# Patient Record
(demographics unavailable — no encounter records)

---

## 2024-10-22 NOTE — PHYSICAL EXAM
[No Edema] : there was no peripheral edema [Normal] : affect was normal and insight and judgment were intact [de-identified] : cn ii-xii without new deficits, 5/5 muscle strength in all 4 extremities

## 2024-10-22 NOTE — HISTORY OF PRESENT ILLNESS
[de-identified] : 68 y/o female with pmhx of HTN, hld, CKD presents for acute visit. About one month ago, she was on a flight. She was sleeping and then she woke up and felt very warm. She then lost consciousness and was shaking. Her mouth was clenched and she urinated on herself. States it lasted about a few minutes. It resolved on its own and she was placed on oxygen. She did not seek emergency medical attention after as she was evaluated by EMTs and found to be stable. Has never happened before.   Patient is concerned that her ct chest showed coronary artery calcifications and would like to be further evaluated. Denies any chest pain

## 2024-10-22 NOTE — ASSESSMENT
[FreeTextEntry1] : S/p Seizure - first episode will work up for underlying etiology - will check cbc, cmp, tfts will send for MRI brain w/w/o iv contrast refer to neurology patient instructed that if she has a repeat episode she is to call 911 and go to the ER immediately  HTN uncontrolled, has previously been well controlled at home will continue amlodipine 5mg daily, hydrochlorothiazide 12.5mg daily, and nebivolol 10mg daily patient to check bp daily and keep a log, if it is persistently >140/90 patient to call  Prediabetes will check hgba1c  HLD continue rosuvastatin 5mg daily  Coronary artery calcifications on CT continue rosuvastatin and will check lipid panel will refer to cardio

## 2024-10-28 NOTE — HISTORY OF PRESENT ILLNESS
[FreeTextEntry1] : 70 y/o female with pmhx of HTN, hld, CKD presents for recent seizure.  Patient reports being in usual state of health until September 7, 2024.  She was on a flight to [place] and was seen in the middle fell asleep woke up feeling hot daughter then turned on the air and rested her head on a table.  She then jerked back to chair arms rigid and starting convulsing lasted 1 to 2 minutes then came to and heard them talking but could not say anything.  A nurse and doctor on the flight came to assist and place patient in a supine position and was noted to be incontinent of urine.  She has some postictal confusion for few minutes and then was back to her self.  Oxygen was administered. When she landed she was back to her self was evaluated by EMS. For rest of the trip she rested and has not had any recurrent events.  She denies any change in her stresses any sleep deprivation or any alcohol or drug use. Does snore She reports from time to time she can have these hot flash feelings at times she can feel "off" and last for few minutes and then resolves.  She reports as a child she had recurrent   episodes of passing out was not evaluated for it and resolved during a high school years. She did have a fall in early 2020s missed a step and landed on her head, had fracture of her right hand as a result never evaluated with a head CT scan  There is no family history of seizures Denies any daydreaming zoning out episodes

## 2024-10-28 NOTE — ASSESSMENT
[FreeTextEntry1] : 68 y/o female with pmhx of HTN, hld, CKD presents today to establish care for first time  GTC sz with urinary incontinence.  unknown trigger.  She does have a history of recurrent syncope episodes as a child or teenager with never formally evaluated for this unsure if these have any clinical significance.  Brain MRI w/wo ordered by PCPRule any structural changes 24hr EEG r/oEpileptiform changes. HSt r/o KURTIS Not interested in medications at this time I have advised patient and daughter of seizure precautions further eval based on above findings.

## 2024-10-28 NOTE — PHYSICAL EXAM
[General Appearance - In No Acute Distress] : in no acute distress [Affect] : the affect was normal [Mood] : the mood was normal [Person] : oriented to person [Place] : oriented to place [Time] : oriented to time [Short Term Intact] : short term memory intact [Fluency] : fluency intact [Current Events] : adequate knowledge of current events [Past History] : adequate knowledge of personal past history [Cranial Nerves Optic (II)] : visual acuity intact bilaterally,  visual fields full to confrontation, pupils equal round and reactive to light [Cranial Nerves Oculomotor (III)] : extraocular motion intact [Cranial Nerves Trigeminal (V)] : facial sensation intact symmetrically [Cranial Nerves Facial (VII)] : face symmetrical [Cranial Nerves Vestibulocochlear (VIII)] : hearing was intact bilaterally [Cranial Nerves Glossopharyngeal (IX)] : tongue and palate midline [Cranial Nerves Accessory (XI - Cranial And Spinal)] : head turning and shoulder shrug symmetric [Cranial Nerves Hypoglossal (XII)] : there was no tongue deviation with protrusion [Motor Tone] : muscle tone was normal in all four extremities [Motor Strength] : muscle strength was normal in all four extremities [No Muscle Atrophy] : normal bulk in all four extremities [Motor Handedness Right-Handed] : the patient is right hand dominant [Sensation Tactile Decrease] : light touch was intact [Abnormal Walk] : normal gait [Balance] : balance was intact [2+] : Ankle jerk left 2+ [PERRL With Normal Accommodation] : pupils were equal in size, round, reactive to light, with normal accommodation [Extraocular Movements] : extraocular movements were intact [] : no respiratory distress [No Spinal Tenderness] : no spinal tenderness [Involuntary Movements] : no involuntary movements were seen [Past-pointing] : there was no past-pointing [Tremor] : no tremor present [Plantar Reflex Right Only] : normal on the right [Plantar Reflex Left Only] : normal on the left

## 2024-10-28 NOTE — HISTORY OF PRESENT ILLNESS
[FreeTextEntry1] : 68 y/o female with pmhx of HTN, hld, CKD presents for recent seizure.  Patient reports being in usual state of health until September 7, 2024.  She was on a flight to [place] and was seen in the middle fell asleep woke up feeling hot daughter then turned on the air and rested her head on a table.  She then jerked back to chair arms rigid and starting convulsing lasted 1 to 2 minutes then came to and heard them talking but could not say anything.  A nurse and doctor on the flight came to assist and place patient in a supine position and was noted to be incontinent of urine.  She has some postictal confusion for few minutes and then was back to her self.  Oxygen was administered. When she landed she was back to her self was evaluated by EMS. For rest of the trip she rested and has not had any recurrent events.  She denies any change in her stresses any sleep deprivation or any alcohol or drug use. Does snore She reports from time to time she can have these hot flash feelings at times she can feel "off" and last for few minutes and then resolves.  She reports as a child she had recurrent   episodes of passing out was not evaluated for it and resolved during a high school years. She did have a fall in early 2020s missed a step and landed on her head, had fracture of her right hand as a result never evaluated with a head CT scan  There is no family history of seizures Denies any daydreaming zoning out episodes

## 2024-11-05 NOTE — HISTORY OF PRESENT ILLNESS
[FreeTextEntry1] : Ms. TEENA HURTADO is a 69-year-old female, referred by Dr. Mccollum for evaluation of left lower lobe lung nodule and right lower lobe opacity.  She is here today to discuss CT surveillance results.   Past medical history includes Covid infection (2021), CKD, HTN, HLD, prediabetes and lung nodule.   04/18/24 CT Chest through [facility] -No significant change in bronchiectasis changes identified within the right lower lobe, with nonspecific ill-defined opacity with associated volume loss with probable scarring. -Unchanged left lower lobe 1 cm ground glass pulmonary nodule.   06/10/24 consultation appointment with Dr. Ovalles: short term 6-month CT surveillance recommended  10/8/24 CT Chest through [facility]  -Stable 1 cm ground glass opacity is noted in the anterior segment of the left lower lobe, unchanged when compared to previous exam -Focal consolidation containing dilated airways in the right lower lobe, unchanged when compared to previous exam

## 2024-11-05 NOTE — DATA REVIEWED
[FreeTextEntry1] : Independent review of imaging and independent interpretation was performed at today's visit. -10/8/24 CT Chest through [facility]

## 2024-11-11 NOTE — ASSESSMENT
[FreeTextEntry1] : Cortney is a 69-year-old female with a history of a groundglass opacity in the left upper lobe that is indeterminant and I will be obtaining a CT scan in 6 months time.  In addition, she has a persistent cough and bronchiectasis in the right lower lobe for which bronchoscopy is likely necessary.  She is in agreement and will be scheduled shortly.  Thank you for allowing me to participate in the care of your patient.  30 minutes was spent during this encounter.  Jemal Ovalles MD Department of Cardiovascular and Thoracic Surgery  [employer] at [facility]

## 2024-11-11 NOTE — HISTORY OF PRESENT ILLNESS
[FreeTextEntry1] : Ms. TEENA HURTADO is a 69-year-old female, referred by Dr. Mccollum for evaluation of left lower lobe lung nodule and right lower lobe opacity.  She is here today to discuss CT surveillance results.   Past medical history includes Covid infection (2021), CKD, HTN, HLD, prediabetes and lung nodule.   04/18/24 CT Chest through [facility] -No significant change in bronchiectasis changes identified within the right lower lobe, with nonspecific ill-defined opacity with associated volume loss with probable scarring. -Unchanged left lower lobe 1 cm ground glass pulmonary nodule.   06/10/24 consultation appointment with Dr. Ovalles: short term 6-month CT surveillance recommended  10/8/24 CT Chest through [facility]  -Stable 1 cm ground glass opacity is noted in the anterior segment of the left lower lobe, unchanged when compared to previous exam -Focal consolidation containing dilated airways in the right lower lobe, unchanged when compared to previous exam  Today she reports a dry cough, intermittent which is off and on and does feel it gets worse with a sharp inhale at times. It can get strong at times. She isn't sure what it may get better or worse with. Patient denies chest pain, palpitations, shortness of breath, fevers, chills, fatigue and unintentional weight loss or gain.

## 2024-11-12 NOTE — REASON FOR VISIT
[Follow-Up] : a follow-up visit [Abnormal CXR/ Chest CT] : an abnormal CXR/ chest CT [Cough] : cough [Bronchiectasis] : bronchiectasis

## 2024-11-12 NOTE — HISTORY OF PRESENT ILLNESS
[On ___] : performed on [unfilled] [Patient] : the patient [Indication ___] : for an indication of [unfilled] [None] : no new symptoms reported [TextBox_4] : Never smoker. S/p Covid infection in 2021 with cough, sob, fatigue, fevers, body aches. She did not require therapy. She had Covid infection again in 6/2024 but with mild symptoms though with residual cough now. Recently was in [facility] ER for abd pain and abd CT revealed basilar bronchiectasis. On Flonase for PNDS and chronic sinus issues. Follows with ENT. Pt denies significant sleep complaints. Chest CT with bronchiectasis and 1 cm nodule. Seen by Dr. Ovalles of thoracic surgery. Will repeat for 6-month f/u. [FreeTextEntry9] : Abd CT [FreeTextEntry8] : RLL area of bronchiectasis [TextEntry] : Chest CT from 4/29/24 revealed focal RLL bronchiectasis with a LLL 1 cm GGO. Chest CT from 10/8/24 revealed stable RLL bronchiectasis with a stable 1 cm GGO in LLL - reviewed results and films with patient.

## 2024-11-12 NOTE — RESULTS/DATA
07-Mar-2024
[TextEntry] : CT imaging as above. CXR from 9/8/23 was clear.
[TextEntry] : CT imaging as above. CXR from 9/8/23 was clear.

## 2024-11-12 NOTE — REVIEW OF SYSTEMS
[Nasal Congestion] : nasal congestion [Postnasal Drip] : postnasal drip [Sinus Problems] : sinus problems [Cough] : cough [SOB on Exertion] : sob on exertion [Seasonal Allergies] : seasonal allergies [Negative] : Endocrine

## 2024-11-12 NOTE — CONSULT LETTER
[Dear  ___] : Dear  [unfilled], [Consult Letter:] : I had the pleasure of evaluating your patient, [unfilled]. [Please see my note below.] : Please see my note below. [Consult Closing:] : Thank you very much for allowing me to participate in the care of this patient.  If you have any questions, please do not hesitate to contact me. [Sincerely,] : Sincerely, [FreeTextEntry3] : Ervin Mccollum MD, FCCP, D. ABSM Pulmonary and Sleep Medicine [facility] at [place]

## 2024-11-12 NOTE — HISTORY OF PRESENT ILLNESS
[On ___] : performed on [unfilled] [Patient] : the patient [Indication ___] : for an indication of [unfilled] [None] : no new symptoms reported [TextBox_4] : Never smoker. S/p Covid infection in 2021 with cough, sob, fatigue, fevers, body aches. She did not require therapy. She had Covid infection again in 6/2024 but with mild symptoms though with residual cough now. Recently was in [facility] ER for abd pain and abd CT revealed basilar bronchiectasis. On Flonase for PNDS and chronic sinus issues. Follows with ENT. Pt denies significant sleep complaints. Chest CT with bronchiectasis and 1 cm nodule. Seen by Dr. Ovalles of thoracic surgery. Will repeat for 6-month f/u. [FreeTextEntry9] : Abd CT [FreeTextEntry8] : RLL area of bronchiectasis [TextEntry] : Chest CT from 4/29/24 revealed focal RLL bronchiectasis with a LLL 1 cm GGO. Chest CT from 10/8/24 revealed stable RLL bronchiectasis with a stable 1 cm GGO in LLL - reviewed results and films with patient.

## 2024-11-12 NOTE — DISCUSSION/SUMMARY
[FreeTextEntry1] :  #1. PFTs with normal manish but reduced lung volumes and moderately reduced DLCO which corrected for alveolar volume. Repeat without significant change. Will continue to monitor. #2. The patient does not appear to require chronic BD therapy at this time. #3. Diet and exercise for weight loss. #4. SOBOE is likely at least somewhat related to weight or deconditioning.  #5. CXR from 9/28/23 was clear but Abd CT from 3/29/24 with RLL bronchiectasis of unclear etiology or significance. Chest CT confirmed this local RLL area of bronchiectasis and 1 cm LLL GGO. Repeat with stable changes. Pt for repeat in 6 months but will first undergo bronchoscopy with Dr. Ovalles. #6. Thoracic surgery re-evaluation for bronchoscopy given persistent area of bronchiectasis of unclear etiology or significance. Per pt bronchoscopy to be scheduled. #7. Pt denies significant sleep complaints.  #8. H/o prior Covid infection. #9. F/u in 3 months for f/u after thoracic surgery f/u and bronchoscopy. Repeat CT in 6 months. #10. ENT f/u and nasal sprays for chronic sinus issues and PNDS.  The patient expressed understanding and agreement with the above recommendations/plan and accepts responsibility to be compliant with recommended testing, therapies, and f/u visits. All relevant questions and concerns were addressed.

## 2024-11-12 NOTE — END OF VISIT
[Time Spent: ___ minutes] : I have spent [unfilled] minutes of time on the encounter which excludes teaching and separately reported services. [TextEntry] : Discussed with pt at length regarding abnormal CT, cough, bronchiectasis, prior Covid infection; reviewed w/u with pt as above.

## 2024-11-12 NOTE — PROCEDURE
[FreeTextEntry1] : PFTs 5/28/24 - normal manish with mildly reduced lung volumes and moderately reduced DLCO which corrected for alveolar volume.  PFTs 11/12/24 - normal manish and lung volumes with mildly reduced DLCO which corrected for alveolar volume. Overall, near baseline.

## 2024-11-12 NOTE — CONSULT LETTER
[Dear  ___] : Dear  [unfilled], [Consult Letter:] : I had the pleasure of evaluating your patient, [unfilled]. [Please see my note below.] : Please see my note below. [Consult Closing:] : Thank you very much for allowing me to participate in the care of this patient.  If you have any questions, please do not hesitate to contact me. [Sincerely,] : Sincerely, [FreeTextEntry3] : Ervin Mccollum MD, FCCP, D. ABSM Pulmonary and Sleep Medicine [facility] Pulmonary and Sleep Medicine at [place]

## 2024-11-19 NOTE — HISTORY OF PRESENT ILLNESS
[de-identified] : 68 y/o female with pmhx of HTN, hld, CKD presents for follow up visit. She is accomapnied by her daughter.  She recently went for evaluation with neurology due to a first time seizure in 9/2024 when she was on a flight. She was sleeping and then she woke up and felt very warm. She then lost consciousness and was shaking. Her mouth was clenched and she urinated on herself. States it lasted about a few minutes. It resolved on its own and she was placed on oxygen. She did not seek emergency medical attention after as she was evaluated by EMTs and found to be stable. Has never happened before.  Patient underwent 24 hour EEG with findings of non-specific bilateral temporal cerebral dysfunction Patient underwent sleep study that revealed mild sleep apnea  Patient will be going for bronchoscopy with Dr. Ovalles due to right lower lobe bronchiectasis and persistent cough. She will be going for a repeat ct scan in 6 months for follow up of left upper lobe groundglass opacity

## 2024-11-19 NOTE — ASSESSMENT
[FreeTextEntry1] : S/p Seizure - first episode was seen by neuro - EEG without epileptiform changes, nonspecific b/l temporal cerebral dysfunction will send for MRI brain w/w/o iv contrast - patient to make appointment patient with mild sleep apnea on sleep study - recommend discussing managment with her pulmonologist Dr. Mccollum patient instructed that if she has a repeat episode she is to call 911 and go to the ER immediately Follow up with neuro  HTN stable, bp at home has been well controlled as per patient will continue amlodipine 5mg daily, hydrochlorothiazide 12.5mg daily, and nebivolol 10mg daily discussed the importance of bp management in the setting of ckd  Prediabetes most recent hgba1c of 5.8, recommend dietary changes  HLD continue rosuvastatin 5mg daily  Coronary artery calcifications on CT continue rosuvastatin 5mg daily, lipid panel stable referred to cardio at last appointment  Completed intermittent FMLA  leave form for daugther to take intermittent time off to help bring patient to her appointments

## 2024-11-19 NOTE — PLAN
[FreeTextEntry1] : Total time on this date and for this encounter was 34 minutes which included: reviewing medical record in preparation to see the patient, performing a medically appropriate examination and/or evaluation, counseling and educating the patient, ordering medications, tests, or procedures, referring to and communicating with other health care professionals about management, and documenting clinical information in the electronic health record. >50% of time spent face to face counseling patient

## 2024-11-19 NOTE — PHYSICAL EXAM
[No Edema] : there was no peripheral edema [Normal] : affect was normal and insight and judgment were intact [de-identified] : cn ii-xii without new deficits, 5/5 muscle strength in all 4 extremities

## 2024-11-26 NOTE — HISTORY OF PRESENT ILLNESS
[FreeTextEntry1] : syncope    This is a 69 year old woman wtih hypertension adn chronic kidney disease , dyslipidemia ,  and coronary calcifications. here with syncope  she  was going to [place].    and on the flight.  and she got flushed .  and warm.     her daughter put air on her.  and then she passed out.  and she start shaking. adn she was still sitting on th eplane   and  came.  and she kept on passing out.  and she was all confused.    but she was still sitting.    No smoking. No alcohol. No drugs.     Family history:  Father:  no myocardial infarction. no Cerebro vascular accident  Mother :  +  myocardial infarction. No cerebro vascualr accident  . at age 58  Siblings:  No myocardial infarction.  +  CVA  .cancer.  adn Diabetes Mellitus

## 2024-11-26 NOTE — CARDIOLOGY SUMMARY
[de-identified] : 11 26 2024:   Sinus Rhythm  -Left atrial enlargement. BORDERLINE [de-identified] : CT chest :   1 cm left lower lobe opacity.   Calcifications of coronary arteries [de-identified] : oct 2024:  LDL : 72.  HDL: 47.  Tgs: 153.    Total: 145.

## 2024-11-26 NOTE — DISCUSSION/SUMMARY
[Patient] : the patient [Risks] : risks [Benefits] : benefits [Alternatives] : alternatives [With Me] : with me [___ Month(s)] : in [unfilled] month(s) [FreeTextEntry1] : This is a 69 year old woman wtih hypertension adn chronic kidney disease , dyslipidemia ,  and coronary calcifications. here with syncope   1) syncope:  liekly vasovagal.  Neuology evlan pending.  we will do 4 weeks event monitror.  transthoracic echocardiogram and exerise stress echo 2) coronary calficiations. ct statins.  stress echo .  3) hypertension : controlled ct curent meds.  [EKG obtained to assist in diagnosis and management of assessed problem(s)] : EKG obtained to assist in diagnosis and management of assessed problem(s)

## 2024-12-16 NOTE — REVIEW OF SYSTEMS
[TextEntry] : Constitutional:  no fever and no chills.   Cardiovascular:  no chest pain and no palpitations.   Respiratory:  no shortness of breath and no wheezing.   Gastrointestinal:  no abdominal pain, no nausea and no vomiting.   Neurological:  no headache and no dizziness.

## 2024-12-16 NOTE — HEALTH RISK ASSESSMENT
[No] : In the past 12 months have you used drugs other than those required for medical reasons? No [No falls in past year] : Patient reported no falls in the past year [0] : 2) Feeling down, depressed, or hopeless: Not at all (0) [PHQ-2 Negative - No further assessment needed] : PHQ-2 Negative - No further assessment needed [Never] : Never [With Family] : lives with family [Retired] : retired [# Of Children ___] : has [unfilled] children [Fully functional (bathing, dressing, toileting, transferring, walking, feeding)] : Fully functional (bathing, dressing, toileting, transferring, walking, feeding) [Fully functional (using the telephone, shopping, preparing meals, housekeeping, doing laundry, using] : Fully functional and needs no help or supervision to perform IADLs (using the telephone, shopping, preparing meals, housekeeping, doing laundry, using transportation, managing medications and managing finances) [de-identified] : been alright [[id number]] : 0 [Reports changes in hearing] : Reports no changes in hearing [Reports changes in vision] : Reports no changes in vision

## 2024-12-16 NOTE — HISTORY OF PRESENT ILLNESS
[de-identified] : 70 y/o female with pmhx of HTN, hld, CKD presents for CPE. Patient overall doing well.  Patient is following with Dr. Espinal nephrology  She recently went for evaluation with neurology due to a first time seizure in 9/2024 when she was on a flight. She was sleeping and then she woke up and felt very warm. She then lost consciousness and was shaking. Her mouth was clenched and she urinated on herself. States it lasted about a few minutes. It resolved on its own and she was placed on oxygen. She did not seek emergency medical attention after as she was evaluated by EMTs and found to be stable. Has never happened before.  Patient underwent 24 hour EEG with findings of non-specific bilateral temporal cerebral dysfunction Patient underwent sleep study that revealed mild sleep apnea Patient was seen by cardiology - now wearing holter monitor  Patient went for bronchoscopy with Dr. Ovalles due to right lower lobe bronchiectasis and persistent cough. Awaiting results of pathology. She will be going for a repeat ct scan in 6 months for follow up of left upper lobe groundglass opacity

## 2024-12-16 NOTE — ASSESSMENT
[FreeTextEntry1] : CPE: -most recent lab work reviewed -medical history including surgical history, family history, and current medications reviewed and documented as above -Age appropriate screenings including but not limited to cancer screening, alcohol misuse (audit-c) and vision screening as documented above -patient counseled on healthy diet and lifestyle changes including increasing physical activity and eating a diet low in processed foods and high in fruits and vegetables -counseled patient on age appropriate vaccinations and immunization record updated as above - up to date with shingrix, will administer high dose flu and tdap today  S/p Seizure - first episode was seen by neuro - EEG without epileptiform changes, nonspecific b/l temporal cerebral dysfunction will send for MRI brain w/w/o iv contrast - patient to make appointment patient with mild sleep apnea on sleep study - recommend discussing managment with her pulmonologist Dr. Mccollum patient instructed that if she has a repeat episode she is to call 911 and go to the ER immediately Follow up with neuro  HTN stable, bp at home has been well controlled as per patient will continue amlodipine 5mg daily, hydrochlorothiazide 12.5mg daily, and nebivolol 10mg daily discussed the importance of bp management in the setting of ckd  Prediabetes most recent hgba1c of 5.8, recommend dietary changes  HLD continue rosuvastatin 5mg daily  Coronary artery calcifications on CT continue rosuvastatin 5mg daily, lipid panel stable patient recently seen by cardio

## 2024-12-30 NOTE — ASSESSMENT
[FreeTextEntry1] : Cortney is a 70-year-old female with a history of bronchiectasis in the right lower lobe who recently underwent bronchoscopy this demonstrated negative cultures and brushings showed few atypical cells which I suspect are inflammatory.  She also has a groundglass opacity in the left lower lobe that has remained unchanged.  She has a follow-up appointment with pulmonary medicine in the near future and I will be contacting them for a discussion regarding options going forward.  From my standpoint surgical resection of the right lower lobe would be an option but may not be necessary if symptoms are controlled.  Thank you for allowing me to participate in the care of your patient.  30 minutes was spent during this encounter.  Jemal Ovalles MD Department of Cardiovascular and Thoracic Surgery  [employer] at [facility]

## 2024-12-30 NOTE — ASSESSMENT
[FreeTextEntry1] : Cortney is a 70-year-old female with a history of bronchiectasis in the right lower lobe who recently underwent bronchoscopy this demonstrated negative cultures and brushings showed few atypical cells which I suspect are inflammatory.  She also has a groundglass opacity in the left lower lobe that has remained unchanged.  She has a follow-up appointment with pulmonary medicine in the near future and I will be contacting them for a discussion regarding options going forward.  From my standpoint surgical resection of the right lower lobe would be an option but may not be necessary if symptoms are controlled.  Thank you for allowing me to participate in the care of your patient.  30 minutes was spent during this encounter.  Jemal Ovalles MD Department of Cardiovascular and Thoracic Surgery  [employer] at [facility]

## 2024-12-30 NOTE — HISTORY OF PRESENT ILLNESS
[FreeTextEntry1] : Ms. TEENA HURTADO is a 69-year-old female, referred by Dr. Mccollum for evaluation of left lower lobe lung nodule and right lower lobe opacity. She presents today to review pathology results.  Past medical history includes Covid infection (2021), CKD, HTN, HLD, prediabetes and lung nodule.  04/18/24 CT Chest through [facility] -No significant change in bronchiectasis changes identified within the right lower lobe, with nonspecific ill-defined opacity with associated volume loss with probable scarring. -Unchanged left lower lobe 1 cm ground glass pulmonary nodule.  06/10/24 consultation appointment with Dr. Ovalles: short term 6-month CT surveillance recommended  10/8/24 CT Chest through [facility] -Stable 1 cm ground glass opacity is noted in the anterior segment of the left lower lobe, unchanged when compared to previous exam -Focal consolidation containing dilated airways in the right lower lobe, unchanged when compared to previous exam  11/11/24 Dr. Ovalles office visit: h/o a groundglass opacity in the left upper lobe that is indeterminant, recommend CT scan in 6 months. In addition, she has a persistent cough and bronchiectasis in the right lower lobe for which bronchoscopy was recommended.  12/11/24 s/p Flexible bronchoscopy, endobronchial brushing and biopsy, bronchial lavage Pathology: - Lung right lower lobe bronchial brush- negative for malignant cells - Lung right lower lobe bronchoalveloar lavage - atypical findings; slide consists of rare disorganized crowded groups and clusters displaying anisonucleosis, irregular chromatin, and prominent  nucleoli as well as reactive bronchial cells, alveolar macrophages and acellular debris. - Right lower lobe biopsy - Minute fragment of benign cartilage and crushed alveolar pneumocytes  Today the patient reports feeling well. Patient denies chest pain, palpitations, shortness of breath, cough, fevers, chills, fatigue and unintentional weight loss or gain.

## 2024-12-30 NOTE — DATA REVIEWED
[FreeTextEntry1] : Independent review of imaging and independent interpretation was performed at today's visit: - 12/11/24 pathology report

## 2024-12-30 NOTE — HISTORY OF PRESENT ILLNESS
[FreeTextEntry1] : Ms. TEENA HURTADO is a 69-year-old female, referred by Dr. Mccollum for evaluation of left lower lobe lung nodule and right lower lobe opacity. She presents today to review pathology results.  Past medical history includes Covid infection (2021), CKD, HTN, HLD, prediabetes and lung nodule.  04/18/24 CT Chest through [facility] -No significant change in bronchiectasis changes identified within the right lower lobe, with nonspecific ill-defined opacity with associated volume loss with probable scarring. -Unchanged left lower lobe 1 cm ground glass pulmonary nodule.  06/10/24 consultation appointment with Dr. Ovalles: short term 6-month CT surveillance recommended  10/8/24 CT Chest through [facility] -Stable 1 cm ground glass opacity is noted in the anterior segment of the left lower lobe, unchanged when compared to previous exam -Focal consolidation containing dilated airways in the right lower lobe, unchanged when compared to previous exam  11/11/24 Dr. Ovalles office visit: h/o a groundglass opacity in the left upper lobe that is indeterminant, recommend CT scan in 6 months. In addition, she has a persistent cough and bronchiectasis in the right lower lobe for which bronchoscopy was recommended.  12/11/24 s/p Flexible bronchoscopy, endobronchial brushing and biopsy, bronchial lavage Pathology: - Lung right lower lobe bronchial brush- negative for malignant cells - Lung right lower lobe bronchoalveloar lavage - atypical findings; slide consists of rare disorganized crowded groups and clusters displaying anisonucleosis, irregular chromatin, and prominent  nucleoli as well as reactive bronchial cells, alveolar macrophages and acellular debris. - Right lower lobe biopsy - Minute fragment of benign cartilage and crushed alveolar pneumocytes  Today the patient reports feeling well. Patient denies chest pain, palpitations, shortness of breath, cough, fevers, chills, fatigue and unintentional weight loss or gain.

## 2025-02-12 NOTE — HISTORY OF PRESENT ILLNESS
[On ___] : performed on [unfilled] [Patient] : the patient [Indication ___] : for an indication of [unfilled] [None] : no new symptoms reported [TextBox_4] : Never smoker. S/p Covid infection in 2021 with cough, sob, fatigue, fevers, body aches. She did not require therapy. She had Covid infection again in 6/2024 but with mild symptoms though with residual cough now. Recently was in [facility] ER for abd pain and abd CT revealed basilar bronchiectasis. On Flonase for PNDS and chronic sinus issues. Follows with ENT. Pt denies significant sleep complaints. Chest CT with bronchiectasis and 1 cm nodule. Seen by Dr. Ovalles of thoracic surgery. S/p non-diagnostic bronchoscopy with only inflammaotry cells. Will repeat in 2 months for 6-month f/u but if worsening, may need resection of GGO. [FreeTextEntry9] : Abd CT [FreeTextEntry8] : RLL area of bronchiectasis [TextEntry] : Chest CT from 4/29/24 revealed focal RLL bronchiectasis with a LLL 1 cm GGO. Chest CT from 10/8/24 revealed stable RLL bronchiectasis with a stable 1 cm GGO in LLL.

## 2025-02-12 NOTE — DISCUSSION/SUMMARY
[FreeTextEntry1] :  #1. PFTs with normal manish but reduced lung volumes and moderately reduced DLCO which corrected for alveolar volume. Repeat without significant change. Will continue to monitor intermittently. #2. The patient does not appear to require chronic BD therapy at this time. #3. Diet and exercise for weight loss. #4. SOBOE is likely at least somewhat related to weight or deconditioning.  #5. CXR from 9/28/23 was clear but Abd CT from 3/29/24 with RLL bronchiectasis of unclear etiology or significance. Chest CT confirmed this local RLL area of bronchiectasis and 1 cm LLL GGO. Repeat with stable changes. Pt for repeat in 2 months for 6-months f/u given non-diagnostic bronchoscopy with Dr. Ovalles. #6. Thoracic surgery f/u for bronchoscopy given persistent area of bronchiectasis of unclear etiology or significance.  #7. Pt denies significant sleep complaints.  #8. H/o prior Covid infection. #9. F/u in 3 months for f/u after thoracic surgery f/u and bronchoscopy. Repeat CT in 6 months. #10. ENT f/u and nasal sprays for chronic sinus issues and PNDS.  The patient expressed understanding and agreement with the above recommendations/plan and accepts responsibility to be compliant with recommended testing, therapies, and f/u visits. All relevant questions and concerns were addressed.  Discussed above with patient and daughter who was also present.

## 2025-02-12 NOTE — CONSULT LETTER
[Dear  ___] : Dear  [unfilled], [Consult Letter:] : I had the pleasure of evaluating your patient, [unfilled]. [Please see my note below.] : Please see my note below. [Consult Closing:] : Thank you very much for allowing me to participate in the care of this patient.  If you have any questions, please do not hesitate to contact me. [Sincerely,] : Sincerely, [FreeTextEntry3] : Ervin Mccollum MD, FCCP, D. ABSM Pulmonary and Sleep Medicine [facility] at [place]

## 2025-02-18 NOTE — HISTORY OF PRESENT ILLNESS
[FreeTextEntry1] : follow up of chronic medical conditions [de-identified] : 70 y/o female with pmhx of HTN, hld, CKD presents for follow up. Patient overall doing well.  Patient is following with Dr. Espinal nephrology - states he recommended a possible increase in amlodipine dose. Home bp has been between 130-140s systolic.   She recently went for evaluation with neurology due to a first time seizure in 9/2024 when she was on a flight. She was sleeping and then she woke up and felt very warm. She then lost consciousness and was shaking. Her mouth was clenched and she urinated on herself. States it lasted about a few minutes. It resolved on its own and she was placed on oxygen. She did not seek emergency medical attention after as she was evaluated by EMTs and found to be stable. Has never happened before.  Patient underwent 24 hour EEG with findings of non-specific bilateral temporal cerebral dysfunction Patient underwent sleep study that revealed mild sleep apnea Patient was seen by cardiology - patient will be going for nuclear stress test  Patient went for bronchoscopy with Dr. Ovalles due to right lower lobe bronchiectasis and persistent cough. Bronchoscopy with negative for malignant cells, some atypical findings thought to be due to inflammation

## 2025-02-18 NOTE — HEALTH RISK ASSESSMENT
[No] : In the past 12 months have you used drugs other than those required for medical reasons? No [No falls in past year] : Patient reported no falls in the past year [0] : 2) Feeling down, depressed, or hopeless: Not at all (0) [PHQ-2 Negative - No further assessment needed] : PHQ-2 Negative - No further assessment needed [Never] : Never [With Family] : lives with family [Retired] : retired [# Of Children ___] : has [unfilled] children [Fully functional (bathing, dressing, toileting, transferring, walking, feeding)] : Fully functional (bathing, dressing, toileting, transferring, walking, feeding) [Fully functional (using the telephone, shopping, preparing meals, housekeeping, doing laundry, using] : Fully functional and needs no help or supervision to perform IADLs (using the telephone, shopping, preparing meals, housekeeping, doing laundry, using transportation, managing medications and managing finances) [de-identified] : been alright [[id number]] : 0 [Reports changes in hearing] : Reports no changes in hearing [Reports changes in vision] : Reports no changes in vision

## 2025-02-18 NOTE — ASSESSMENT
[FreeTextEntry1] : S/p Seizure - first episode was seen by neuro - EEG without epileptiform changes, nonspecific b/l temporal cerebral dysfunction MRI brain w/w/o iv contrast - Moderate chronic microvascular changes. patient with mild sleep apnea on sleep study patient instructed that if she has a repeat episode, she is to call 911 and go to the ER immediately Follow up with neuro  HTN bp borderline on home readings, however bp well controlled in office will continue amlodipine 5mg daily, hydrochlorothiazide 12.5mg daily, and nebivolol 10mg daily patient to bring in home bp machine to follow up visit in 3 months  Prediabetes most recent hgba1c of 5.8, recommend dietary changes  HLD continue rosuvastatin 5mg daily  Coronary artery calcifications on CT continue rosuvastatin 5mg daily, lipid panel stable patient recently seen by cardio  CKD continue to follow with nephro

## 2025-03-06 NOTE — HISTORY OF PRESENT ILLNESS
[FreeTextEntry1] : 69 yo female with PMH of HTN, CKD, bronchiectasis who comes to the clinic for evaluation for CKD. She was diagnosed with CKD few years ago- following with Dr. Espinal. She had a kidney biopsy at [facility] in 2022 and was told that she has CKD due to HTN. She is coming here due to Insurance issues- trying to stay in network.   Currently denies any complaints. no LE edema. Last GFR 19 cc/min. Hb 13. She had 1.1 g proteinuria on 24 hour urine. SPEP was normal.   PMH: HTN for 10 years- previously uncontrolled Seizure- one episode HLD Gout Bronchiectasis and lung nodule- follows with Pulmonologist- bronchoscopy- non diagnostic  PSH: Hysterectomy  SH: used to work in [employer] never cig occasional EtOH  FH: sister had DM and was on HD

## 2025-03-06 NOTE — ASSESSMENT
[FreeTextEntry1] : 1. CKD stage 4- likely from HTN nephrosclerosis. WiIl obtain biopsy reports from [facility]. Will check renal panel. Will eventually need dialysis and transplant education/ referral 2. HTN- BP controlled. 3. Proteinuria- 24-hour urine showed 1.1 g. SPEP normal. will also check hepatitis panel and serum free light chains. obtain biopsy report 4. Anemia- Hb 13 5. Bone and mineral metabolism- check Ca, Phos and PTH   Thanks for the consultation

## 2025-03-17 NOTE — ASSESSMENT
[FreeTextEntry1] : 1. CKD stage 4- likely from HTN nephrosclerosis. GFR around 15-18 recently. Biopsy showing diffuse global glomerulosclerosis. Discussed briefly about dialysis. refer to HD education class. transplant education/ referral 2. HTN- BP controlled. 3. Proteinuria- 24-hour urine showed 1.1 g. SPEP normal. hepatitic Ab positive. PCR negative. Biopsy showing no diffuse global glomerulosclerosis.   4. Anemia- Hb normal  5. Bone and mineral metabolism- Ca, Phos and PTH 55. normal.

## 2025-03-17 NOTE — HISTORY OF PRESENT ILLNESS
[FreeTextEntry1] : Subjective: patient doing well. no complaints. no edema BP controlled.   Renal history: CKD from likely HTN nephrosclerosis. proteinuric. SPEP normal. Biopsy 2023 showing diffuse global glomerulosclerosis.   PMH: HTN for 10 years- previously uncontrolled Seizure- one episode HLD Gout Bronchiectasis and lung nodule- follows with Pulmonologist- bronchoscopy- non diagnostic  PSH: Hysterectomy  SH: used to work in [employer] never cig occasional EtOH  FH: sister had DM and was on HD

## 2025-04-23 NOTE — PHYSICAL EXAM
[General Appearance - Alert] : alert [General Appearance - In No Acute Distress] : in no acute distress [General Appearance - Well Developed] : well developed [Sclera] : the sclera and conjunctiva were normal [Extraocular Movements] : extraocular movements were intact [Outer Ear] : the ears and nose were normal in appearance [Hearing Threshold Finger Rub Not Jones] : hearing was normal [Nasal Cavity] : the nasal mucosa and septum were normal [Neck Appearance] : the appearance of the neck was normal [Neck Cervical Mass (___cm)] : no neck mass was observed [Respiration, Rhythm And Depth] : normal respiratory rhythm and effort [Auscultation Breath Sounds / Voice Sounds] : lungs were clear to auscultation bilaterally [Heart Rate And Rhythm] : heart rate was normal and rhythm regular [Heart Sounds] : normal S1 and S2 [Heart Sounds Gallop] : no gallops [Full Pulse] : the pedal pulses are present [Edema] : there was no peripheral edema [Bowel Sounds] : normal bowel sounds [Abdomen Soft] : soft [Abdomen Tenderness] : non-tender [Cervical Lymph Nodes Enlarged Posterior Bilaterally] : posterior cervical [Cervical Lymph Nodes Enlarged Anterior Bilaterally] : anterior cervical [Supraclavicular Lymph Nodes Enlarged Bilaterally] : supraclavicular [Abnormal Walk] : normal gait [Nail Clubbing] : no clubbing  or cyanosis of the fingernails [Musculoskeletal - Swelling] : no joint swelling seen [Skin Color & Pigmentation] : normal skin color and pigmentation [Skin Turgor] : normal skin turgor [] : no rash [Oriented To Time, Place, And Person] : oriented to person, place, and time [Impaired Insight] : insight and judgment were intact [Affect] : the affect was normal

## 2025-04-23 NOTE — ASSESSMENT
[Good candidate] : a good candidate. We should proceed with our protocol for evaluation for kidney transplantation. [FreeTextEntry1] : 70 year old female with CKD not on dialysis yet excellent functional status follow up with pulmonary for stable lung nodule and mild bronchiectasis

## 2025-04-23 NOTE — PLAN
[FreeTextEntry1] : 1.  CKD stage 4 - Ms. Khan is a good candidate for kidney transplant.   She will discuss living donation with her family.  2.  Bronchiectasis/ pulm nodule - has been seen by pulm and CT surgery.  Will obtain clearance and send to transplant ID.   3.  CV - no CAD.  Pharm stress and echo wnl.  4.  Possible seizure history - has seen neurology and not on anti-epileptic meds.  5.  Cancer screening - Will need age appropriate screening  6.  Imaging - CT a/p.    I have personally discussed the risks and benefits of transplantation and patient attended transplant education class where the following was disclosed:   Reviewed factors affecting survival and morbidity while on dialysis, the transplant wait list and reviewed miley-operative and long-term risk factors affecting outcome in kidney transplantation.     One year SRTR outcomes for national and [facility] were discussed in regards to patient survival and graft survival after transplantation.     Details of transplant surgery, including complications were discussed. Immunosuppression and complications including infection including life threatening sepsis and opportunistic infections, malignancy and new onset diabetes were discussed.     Benefits of live donor transplantation as well as variability in wait times across regions and multiple listing were discussed.  KDPI >85% and PHS high risk criteria donors were discussed.  HCV kidney transplantation was discussed.   Will proceed with completing/ updating work up and listing for transplant/ live donor transplant once work up is reviewed and found to be acceptable by multidisciplinary listing committee.

## 2025-04-23 NOTE — HISTORY OF PRESENT ILLNESS
[Hypertension] : Hypertension [TextBox_42] : 70 year old female with CKD not on dialysis yet kidney disease presumed to be secondary to high blood pressure long term hypertension no diabetes and no cardiac issues chronic cough post covid; CT scan mild basal bronchiectasis and a small lung nodule; stable no hospital admissions had hysterectomy 30 years ago very active and can walk over a mile non smoker she is  and has a son and a daughter 40 and 46 year old

## 2025-04-28 NOTE — DATA REVIEWED
[FreeTextEntry1] : Independent review of imaging and independent interpretation was performed at today's visit. -4/18/25 CT Chest non contrast through [facility]

## 2025-04-28 NOTE — ASSESSMENT
[FreeTextEntry1] : Cortney is a 70-year-old female with a history of bronchiectasis in the right lower lobe status post bronchoscopy in the past and a stable groundglass opacity in the left lower lobe.  I do believe continued surveillance is appropriate and I have asked her to obtain a CT scan in 6 months time.  Thank you for allowing me to participate in the care of your patient.  30 minutes was spent during this encounter.  Jemal Ovalles MD Department of Cardiovascular and Thoracic Surgery  [employer] at [facility]

## 2025-04-28 NOTE — HISTORY OF PRESENT ILLNESS
[FreeTextEntry1] : Ms. TEENA KHAN is a 69-year-old female, referred by Dr. Mccollum who presents for follow up visit. At our last visit, it was recommended she continue follow up with pulmonology. She recently underwent CT chest which she presents today to review.   Past medical history includes Covid infection (2021), CKD, HTN, HLD, prediabetes and lung nodule.  04/18/24 CT Chest through [facility] -No significant change in bronchiectasis changes identified within the right lower lobe, with nonspecific ill-defined opacity with associated volume loss with probable scarring. -Unchanged left lower lobe 1 cm ground glass pulmonary nodule.  06/10/24 consultation appointment with Dr. Ovalles: short term 6-month CT surveillance recommended  10/8/24 CT Chest through [facility] -Stable 1 cm ground glass opacity is noted in the anterior segment of the left lower lobe, unchanged when compared to previous exam -Focal consolidation containing dilated airways in the right lower lobe, unchanged when compared to previous exam  11/11/24 Dr. Ovalles office visit: h/o a groundglass opacity in the left upper lobe that is indeterminant, recommend CT scan in 6 months. In addition, she has a persistent cough and bronchiectasis in the right lower lobe for which bronchoscopy was recommended.  12/11/24 s/p Flexible bronchoscopy, endobronchial brushing and biopsy, bronchial lavage Pathology: - Lung right lower lobe bronchial brush- negative for malignant cells - Lung right lower lobe bronchoalveloar lavage - atypical findings; slide consists of rare disorganized crowded groups and clusters displaying anisonucleosis, irregular chromatin, and prominent nucleoli as well as reactive bronchial cells, alveolar macrophages and acellular debris. - Right lower lobe biopsy - Minute fragment of benign cartilage and crushed alveolar pneumocytes  4/18/25 CT Chest non contrast through [facility] -Unchanged 1cm left lower lobe ground glass nodule.  -Unchanged right lower lobe consolidation and adjacent bronchiectasis and volume loss  Today Ms Khan reports that she is overall feeling well; she denies chest pain, palpitations, shortness of breath, cough, dysphagia, nausea/vomiting, fevers, chills, fatigue, unintentional weight loss or gain, and night sweats.

## 2025-04-28 NOTE — HISTORY OF PRESENT ILLNESS
[FreeTextEntry1] : Ms. TEENA KHAN is a 69-year-old female, referred by Dr. Mccollum who presents for follow up visit. At our last visit, it was recommended she continue follow up with pulmonology. She recently underwent CT chest which she presents today to review.   Past medical history includes Covid infection (2021), CKD, HTN, HLD, prediabetes and lung nodule.  04/18/24 CT Chest through [facility] -No significant change in bronchiectasis changes identified within the right lower lobe, with nonspecific ill-defined opacity with associated volume loss with probable scarring. -Unchanged left lower lobe 1 cm ground glass pulmonary nodule.  06/10/24 consultation appointment with Dr. Ovalles: short term 6-month CT surveillance recommended  10/8/24 CT Chest through [facility] -Stable 1 cm ground glass opacity is noted in the anterior segment of the left lower lobe, unchanged when compared to previous exam -Focal consolidation containing dilated airways in the right lower lobe, unchanged when compared to previous exam  11/11/24 Dr. Ovalles office visit: h/o a groundglass opacity in the left upper lobe that is indeterminant, recommend CT scan in 6 months. In addition, she has a persistent cough and bronchiectasis in the right lower lobe for which bronchoscopy was recommended.  12/11/24 s/p Flexible bronchoscopy, endobronchial brushing and biopsy, bronchial lavage Pathology: - Lung right lower lobe bronchial brush- negative for malignant cells - Lung right lower lobe bronchoalveloar lavage - atypical findings; slide consists of rare disorganized crowded groups and clusters displaying anisonucleosis, irregular chromatin, and prominent nucleoli as well as reactive bronchial cells, alveolar macrophages and acellular debris. - Right lower lobe biopsy - Minute fragment of benign cartilage and crushed alveolar pneumocytes  4/18/25 CT Chest non contrast through [facility] -Unchanged 1cm left lower lobe ground glass nodule.  -Unchanged right lower lobe consolidation and adjacent bronchiectasis and volume loss  Today Ms Khan reports that she is overall feeling well; she denies chest pain, palpitations, shortness of breath, cough, dysphagia, nausea/vomiting, fevers, chills, fatigue, unintentional weight loss or gain, and night sweats.

## 2025-04-28 NOTE — DATA REVIEWED
[FreeTextEntry1] : Independent review of imaging and independent interpretation was performed at today's visit. -4/18/25 CT Chest non contrast through [facility]

## 2025-04-28 NOTE — ASSESSMENT
[FreeTextEntry1] : Cortney is a 70-year-old female with a history of bronchiectasis in the right lower lobe status post bronchoscopy in the past and a stable groundglass opacity in the left lower lobe.  I do believe continued surveillance is appropriate and I have asked her to obtain a CT scan in 6 months time.  Thank you for allowing me to participate in the care of your patient.  30 minutes was spent during this encounter.  Jemal Ovalles MD Department of Cardiovascular and Thoracic Surgery  [employer] [facility]

## 2025-04-29 NOTE — ASSESSMENT
[FreeTextEntry1] : 1. CKD stage 4- likely from HTN nephrosclerosis. GFR around 15-18 recently. Biopsy showing diffuse global glomerulosclerosis. She is interested in in-center HD. will refer to surgery for AVF 2. HTN- BP controlled. 3. Proteinuria- 24-hour urine showed 1.1 g. SPEP normal. hepatitic Ab positive. PCR negative. Biopsy showing diffuse global glomerulosclerosis.   4. Anemia- Hb normal  5. Bone and mineral metabolism- stable  RTC 3 months

## 2025-04-29 NOTE — HISTORY OF PRESENT ILLNESS
[FreeTextEntry1] : Subjective: patient doing well. no complaints. no edema went to transplant clinic evaluation in progress.   Renal history: CKD from likely HTN nephrosclerosis. proteinuric. SPEP normal. Biopsy 2023 showing diffuse global glomerulosclerosis.   PMH: HTN for 10 years- previously uncontrolled Seizure- one episode HLD Gout Bronchiectasis and lung nodule- follows with Pulmonologist- bronchoscopy- non diagnostic Hepatitis C- treated several years ago- Last positive Hepatitis cAb. PCR negative  PSH: Hysterectomy  SH: used to work in [employer] never cig occasional EtOH  FH: sister had DM and was on HD

## 2025-05-12 NOTE — ADDENDUM
[FreeTextEntry1] : I, Mary Carmen Cornejo NP, acted as scribe for Dr. Patricia Moy for this patient encounter

## 2025-05-12 NOTE — HISTORY OF PRESENT ILLNESS
[FreeTextEntry1] : TEENA HURTADO is a 70 year old female with PMH of CKD 4, bronchiectasis, mild KURTIS, HTN, HLD, an isolated seizure in September 2024, presenting today for colon cancer screening prior to being placed on the transplant list. Her last colonoscopy was over 10 year ago but she is unsure if she had polyps. She had a normal Cologuard about one year ago. No family history of colon cancer or polyps. She denies abdominal pian, bowel changes, rectal bleeding. She moves her bowels regularly. No upper GI complaints.   She reports an isolated seizure in September 2024 while traveling. She followed with neuro Dr. Adler and underwent an EEG that showed non specific bilateral temporal dysfunction but no eplieptiform discharges or seizures. She is not on any anti seizure medications.

## 2025-05-12 NOTE — ASSESSMENT
[FreeTextEntry1] : 70 year old female presenting for colon cancer screening prior to being placed on the kidney transplant list. She will be scheduled for a colonoscopy pending neurology clearance. I have discussed the indications (including but not limited to ruling out inflammatory bowel disease, colorectal neoplasm, GI bleed, and AVM's), benefits, risks (including but not limited to reaction to the anesthesia, infection, bleeding, missed lesions, and perforation), and alternatives to colonoscopy with the patient. The patient understands all options and has agreed to have a colonoscopy and is medically optimized for the planned procedure.   Miralax prep Neuro clearance prior to procedure  I, Dr. Patricia Moy was present for the history and physical Daughter was at the bedside-all questions answered Patient had an isolated seizure September 2024.  No further seizure activity.  EEG was negative for epileptic activity Patient currently has no GI symptoms I agree with screening colonoscopy prior to kidney transplant Abdominal exam-positive bowel sound soft nontender

## 2025-05-12 NOTE — REASON FOR VISIT
[Initial Evaluation] : an initial evaluation [Family Member] : family member [FreeTextEntry1] : pre transplant colon cancer screening

## 2025-05-13 NOTE — DISCUSSION/SUMMARY
[Patient] : the patient [Risks] : risks [Benefits] : benefits [Alternatives] : alternatives [With Me] : with me [___ Year(s)] : in [unfilled] year(s) [FreeTextEntry1] : This is a 70  year old woman wtih hypertension adn chronic kidney disease , dyslipidemia ,  and coronary calcifications. here with syncope   1) syncope:  likely vasovagal.   neurology follow up.  2) coronary calcifications ct statins.  normal stress test .  LDL goal < 70  3) hypertension : controlled ct curent meds.  [EKG obtained to assist in diagnosis and management of assessed problem(s)] : EKG obtained to assist in diagnosis and management of assessed problem(s)

## 2025-05-13 NOTE — CARDIOLOGY SUMMARY
[de-identified] : 5 13 2025: Sinus Bradycardia  WITHIN NORMAL LIMITS  11 26 2024:   Sinus Rhythm  -Left atrial enlargement. BORDERLINE [de-identified] : nov 2024:  4 weeks event momnitor unremarkable  [de-identified] :  Apr 2025:  [place] nuclear.  NORMAL SDTUDY  + ischemic changes.  but normal perfusion. No ischemia.  Normal LVEF  [de-identified] : dec 2024:  LVEF :  normal.  normal Rv mild Aortic regurgitation  [de-identified] : CT chest :   1 cm left lower lobe opacity.   Calcifications of coronary arteries [de-identified] : apr 2025:  HDL: 50.  LDL: 99.  Total: 178  Tgs: 169  oct 2024:  LDL : 72.  HDL: 47.  Tgs: 153.    Total: 145.

## 2025-05-13 NOTE — HISTORY OF PRESENT ILLNESS
[FreeTextEntry1] : syncope   HPI for today:  5 13 2025:  feels good. opn kidney transplant list.  no chest pain . no dyspnea on exertion she is going to [place] for 4-5 days. no symptoms.   OLD NOTE: This is a 69 year old woman wtih hypertension adn chronic kidney disease , dyslipidemia ,  and coronary calcifications. here with syncope  she  was going to [place].    and on the flight.  and she got flushed .  and warm.     her daughter put air on her.  and then she passed out.  and she start shaking. adn she was still sitting on th eplane   and  came.  and she kept on passing out.  and she was all confused.    but she was still sitting.    No smoking. No alcohol. No drugs.     Family history:  Father:  no myocardial infarction. no Cerebro vascular accident  Mother :  +  myocardial infarction. No cerebro vascualr accident  . at age 58  Siblings:  No myocardial infarction.  +  CVA  .cancer.  adn Diabetes Mellitus

## 2025-05-15 NOTE — HISTORY OF PRESENT ILLNESS
[FreeTextEntry1] : follow up of chronic medical conditions [de-identified] : 68 y/o female with pmhx of HTN, hld, CKD presents for follow up. Patient overall doing well. Patient was recently seen by nephrologist with [facility]. She is now undergoing evaluation for possible renal transplant. She will be going for a colonoscopy soon. Will be seeing neurology again soon in setting of seizure like episode 9/2024. No further events since

## 2025-05-15 NOTE — ASSESSMENT
[FreeTextEntry1] : S/p Seizure like episode - no further episodes was seen by neuro - EEG without epileptiform changes, nonspecific b/l temporal cerebral dysfunction MRI brain w/w/o iv contrast - Moderate chronic microvascular changes. patient with mild sleep apnea on sleep study  HTN bp borderline on home readings, however bp well controlled in office will continue amlodipine 5mg daily, hydrochlorothiazide 12.5mg daily, and nebivolol 10mg daily patient brought in her home bp machine today, it appears that her home bp machine was reading 128/68 when manual read was 110/58  Prediabetes most recent hgba1c of 5.8, recommend dietary changes  HLD continue rosuvastatin 5mg daily  Coronary artery calcifications on CT continue rosuvastatin 5mg daily, lipid panel stable patient recently seen by cardio  CKD continue to follow with nephro

## 2025-05-16 NOTE — HISTORY OF PRESENT ILLNESS
[FreeTextEntry1] : Pt is a 69 y/o F who presents today for initial evaluation prior to possible renal transplant.  She has PMH CKD (no HD as of yet), HTN, HLD, preDM Pt reports feeling well and has no active cardiac complaints - denies CP, SOB, palpitations, dizziness, syncope, edema, orthopnea, PND, orthopnea.  No exertional symptoms - walks several times per week, walks at least 20 blocks.  Home -120's  TTE 12/2024 EF 55-60%, mild AI Pharm nuc stress test 04/2025 normal myocardial perfusion CT chest 04/2025 notes coronary artery calcifications mammo 07/2024 RICARDO = 1 CUS 12/2024 wnl   Previous surgeries: hysterectomy sec to fibroids 1999 - no problems with anesthesia Family hx: no SCD, mother MI 50's, father HTN, sister CVA/DM, brother HTN/brain aneurysm Smoking status: never social ETOH no drug use Current exercise: walking a few times per week Daily water intake: 64 oz Daily caffeine intake: 1 cup coffee OTC medications: allergy medication 2 children - no problem with pregnancies

## 2025-05-16 NOTE — DISCUSSION/SUMMARY
[EKG obtained to assist in diagnosis and management of assessed problem(s)] : EKG obtained to assist in diagnosis and management of assessed problem(s) [FreeTextEntry1] : Pt is a 71 y/o F who presents today for initial evaluation prior to possible renal transplant.  She has PMH CKD (no HD as of yet), HTN, HLD, preDM Pt reports feeling well and has no active cardiac complaints - denies CP, SOB, palpitations, dizziness, syncope, edema, orthopnea, PND, orthopnea.  No exertional symptoms - walks several times per week, walks at least 20 blocks.   TTE 12/2024 EF 55-60%, mild AI Pharm nuc stress test 04/2025 normal myocardial perfusion CT chest 04/2025 notes coronary artery calcifications mammo 07/2024 RICARDO = 1 CUS 12/2024 wnl  At this time the pt has no signs or symptoms of active ischemia, heart failure, life-threatening arrhythmias, severe valvular pathology. VSS There are no cardiac contraindications for planned procedure.   HTN: well controlled c/w HCTZ 12.5mg qd c/w bystolic 10mg qd c/w amlodipine 5mg qd Discussed the long-term health risks of uncontrolled BP.  Advised low salt diet, regular exercise, maintaining ideal weight.  HLD: c/w crestor 5mg qd Advised lifestyle modifications   preDM: Advise lifestyle modifications   The described plan was discussed with the pt.  All questions and concerns were addressed to the best of my knowledge.  Encouraged pt to check BP at home and keep journal

## 2025-05-29 NOTE — PHYSICAL EXAM
[Affect] : the affect was normal [Mood] : the mood was normal [Person] : oriented to person [Place] : oriented to place [Time] : oriented to time [Short Term Intact] : short term memory intact [Fluency] : fluency intact [Current Events] : adequate knowledge of current events [Past History] : adequate knowledge of personal past history [Cranial Nerves Optic (II)] : visual acuity intact bilaterally,  visual fields full to confrontation, pupils equal round and reactive to light [Cranial Nerves Oculomotor (III)] : extraocular motion intact [Cranial Nerves Trigeminal (V)] : facial sensation intact symmetrically [Cranial Nerves Facial (VII)] : face symmetrical [Cranial Nerves Vestibulocochlear (VIII)] : hearing was intact bilaterally [Cranial Nerves Glossopharyngeal (IX)] : tongue and palate midline [Cranial Nerves Accessory (XI - Cranial And Spinal)] : head turning and shoulder shrug symmetric [Cranial Nerves Hypoglossal (XII)] : there was no tongue deviation with protrusion [Motor Tone] : muscle tone was normal in all four extremities [Motor Strength] : muscle strength was normal in all four extremities [No Muscle Atrophy] : normal bulk in all four extremities [Motor Handedness Right-Handed] : the patient is right hand dominant [Sensation Tactile Decrease] : light touch was intact [Abnormal Walk] : normal gait [Balance] : balance was intact [2+] : Ankle jerk left 2+ [General Appearance - In No Acute Distress] : in no acute distress [PERRL With Normal Accommodation] : pupils were equal in size, round, reactive to light, with normal accommodation [Extraocular Movements] : extraocular movements were intact [] : no respiratory distress [No Spinal Tenderness] : no spinal tenderness [Past-pointing] : there was no past-pointing [Tremor] : no tremor present [Plantar Reflex Right Only] : normal on the right [Plantar Reflex Left Only] : normal on the left

## 2025-05-29 NOTE — HISTORY OF PRESENT ILLNESS
[FreeTextEntry1] : **Interval 5.28.25: completed 24-hour EEG reported nonspecific bilateral temporal cerebral dysfunction.  Routine EEG reported focal dysfunction in the left temporal region. Brain MRI reported moderate chronic microvascular changes as well as sinus disease. she has not had any seizures or any sz-like activity since last seen. sleep well. mood is stable.  She has recently traveled from  [place] and did well on the plane.  INITIAL 68 y/o female with pmhx of HTN, hld, CKD presents for recent seizure. Patient reports being in usual state of health until September 7, 2024.  She was on a flight to [place] and was seen in the middle fell asleep woke up feeling hot daughter then turned on the air and rested her head on a table.  She then jerked back to chair arms rigid and starting convulsing lasted 1 to 2 minutes then came to and heard them talking but could not say anything.  A nurse and doctor on the flight came to assist and place patient in a supine position and was noted to be incontinent of urine.  She has some postictal confusion for few minutes and then was back to her self.  Oxygen was administered. When she landed she was back to her self was evaluated by EMS. For rest of the trip she rested and has not had any recurrent events.  She denies any change in her stresses any sleep deprivation or any alcohol or drug use. Does snore She reports from time to time she can have these hot flash feelings at times she can feel "off" and last for few minutes and then resolves.  She reports as a child she had recurrent   episodes of passing out was not evaluated for it and resolved during a high school years. She did have a fall in early 2020s missed a step and landed on her head, had fracture of her right hand as a result never evaluated with a head CT scan  There is no family history of seizures Denies any daydreaming zoning out episodes

## 2025-05-29 NOTE — ASSESSMENT
[FreeTextEntry1] : 71 y/o female with pmhx of HTN, hld, CKD presents today for follow up. she had one time ?GTC sz vs vasovagal syncope, no recurrent events since.   Brain MRI  Microvascular changes.  Ambulatory and routine EEG  with left frontotemporal slowing, cerebral dysfunction, no epileptiform activity.  will continue to monitor.   From a  Neuro standpoint, she is neurologically stable to complete colonoscopy. she is to remain normotensive through out procedure and post.  all questions answered.

## 2025-06-11 NOTE — HISTORY OF PRESENT ILLNESS
[FreeTextEntry1] : Pleasant 70-year-old female.  End-stage renal disease currently not on hemodialysis.  She is right-handed.  She is being worked up for renal transplant.  She is here to discuss creation of AV fistula.

## 2025-06-11 NOTE — ASSESSMENT
[FreeTextEntry1] : Patient with CKD here to establish care and discuss fistula creation.  Vein mapping shows an adequate cephalic vein for creation of a left radiocephalic intravenous fistula.  We discussed following creation of takes about 8 to 12 weeks prior to fistula being ready.  She may need adjunct procedures prior to use of the AV fistula or following use of the AV fistula to maintain it.  She understands this.  She understands the fistula will not last forever but with 3-month follow-ups and possible reinterventions we can prolong the half-life of the fistula.  She has already undergone cardiac restratification and cleared for renal transplants.  Will schedule for early July.  Will discuss with nephrology as well.

## 2025-06-11 NOTE — PHYSICAL EXAM
[Respiratory Effort] : normal respiratory effort [2+] : left 2+ [de-identified] : Appears well [de-identified] : Bilateral upper extremities with no evidence of thrombophlebitis and/or swelling.

## 2025-07-15 NOTE — HISTORY OF PRESENT ILLNESS
[FreeTextEntry1] : Subjective: Cortney comes to the clinic for follow-up visit.  Overall she is in good health.  She is feeling lightheaded and dizzy.  She had a AV fistula placed a few weeks ago.  Renal history: CKD from likely HTN nephrosclerosis. proteinuric. SPEP normal. Biopsy 2023 showing diffuse global glomerulosclerosis.   PMH: HTN for 10 years- previously uncontrolled Seizure- one episode HLD Gout Bronchiectasis and lung nodule- follows with Pulmonologist- bronchoscopy- non diagnostic Hepatitis C- treated several years ago- Last positive Hepatitis cAb. PCR negative  PSH: Hysterectomy  SH: used to work in [employer] never cig occasional EtOH  FH: sister had DM and was on HD

## 2025-07-15 NOTE — ASSESSMENT
[FreeTextEntry1] : 1. CKD stage 4- likely from HTN nephrosclerosis. GFR around 15-18 recently. Biopsy showing diffuse global glomerulosclerosis.  S/p AV fistula placement 2 weeks ago.  Transplant evaluation ongoing.  GFR 16 cc/min. 2. HTN- BP on the lower side.  I am asked her to stop taking amlodipine.  Asked her to check her blood pressure every day and restart if it is more than 130 or 140 systolic. 3. Proteinuria- 24-hour urine showed 1.1 g. SPEP normal. hepatitic Ab positive. PCR negative. Biopsy showing diffuse global glomerulosclerosis.   4. Anemia- Hb normal  5. Bone and mineral metabolism-phosphorus is slightly high.  Counseled on dietary restriction.  Last PTH was 110.  Repeat with next labs.  RTC 3 months

## 2025-07-22 NOTE — DISCUSSION/SUMMARY
[FreeTextEntry1] :  #1. PFTs with normal manish but reduced lung volumes and moderately reduced DLCO which corrected for alveolar volume. Repeat without significant change. Will continue to monitor intermittently. #2. The patient does not appear to require chronic BD therapy at this time. #3. Diet and exercise for weight loss. #4. SOBOE is likely at least somewhat related to weight or deconditioning.  #5. CXR from 9/28/23 was clear but Abd CT from 3/29/24 with RLL bronchiectasis of unclear etiology or significance. Chest CT confirmed this local RLL area of bronchiectasis and 1 cm LLL GGO. Repeat with stable changes. CT for 6-months f/u given non-diagnostic bronchoscopy with Dr. Ovalles revealed stable changes. Consider repeat in one year (4-5/2026). #6. Thoracic surgery f/u for bronchoscopy given persistent area of bronchiectasis of unclear etiology or significance.  #7. Pt denies significant sleep complaints.  #8. H/o prior Covid infection. #9. F/u in 4-5 months with PFTs and chest CT in 4-5/2026 for one year f/u. Thoracic surgery f/u to review imaging studies. S/p bronchoscopy in 12/2024 which was not diagnostic.  #10. ENT f/u and nasal sprays for chronic sinus issues and PNDS.  The patient expressed understanding and agreement with the above recommendations/plan and accepts responsibility to be compliant with recommended testing, therapies, and f/u visits. All relevant questions and concerns were addressed.

## 2025-07-22 NOTE — CONSULT LETTER
[Dear  ___] : Dear  [unfilled], [Consult Letter:] : I had the pleasure of evaluating your patient, [unfilled]. [Please see my note below.] : Please see my note below. [Consult Closing:] : Thank you very much for allowing me to participate in the care of this patient.  If you have any questions, please do not hesitate to contact me. [Sincerely,] : Sincerely, [FreeTextEntry3] : Ervin Mccollum MD, FCCP, D. ABSM Pulmonary and Sleep [facility] at [place]

## 2025-07-22 NOTE — HISTORY OF PRESENT ILLNESS
[On ___] : performed on [unfilled] [Patient] : the patient [Indication ___] : for an indication of [unfilled] [None] : no new symptoms reported [TextBox_4] : Never smoker. S/p Covid infection in 2021 with cough, sob, fatigue, fevers, body aches. She did not require therapy. She had Covid infection again in 6/2024 but with mild symptoms though with residual cough now. Recently was in [facility] for abd pain and abd CT revealed basilar bronchiectasis. On Flonase for PNDS and chronic sinus issues. Follows with ENT. Pt denies significant sleep complaints. Chest CT with bronchiectasis and 1 cm nodule. Seen by Dr. Ovalles of thoracic surgery. S/p non-diagnostic bronchoscopy in 12/2024 with only inflammatory cells. Repeat CT with stable changes and follow intermittently. Thoracic surgery f/u and if worsening, may need resection of GGO. [FreeTextEntry9] : Abd CT [FreeTextEntry8] : RLL area of bronchiectasis [TextEntry] : Chest CT from 4/29/24 revealed focal RLL bronchiectasis with a LLL 1 cm GGO. Chest CT from 10/8/24 revealed stable RLL bronchiectasis with a stable 1 cm GGO in LLL. Chest CT from 4/18/25 with stable RLL bronchiectasis/consolidation and stable 1 cm GGO in LLL - reviewed results and films with patient.